# Patient Record
Sex: FEMALE | Race: WHITE | HISPANIC OR LATINO | URBAN - METROPOLITAN AREA
[De-identification: names, ages, dates, MRNs, and addresses within clinical notes are randomized per-mention and may not be internally consistent; named-entity substitution may affect disease eponyms.]

---

## 2021-11-09 ENCOUNTER — OFFICE VISIT (OUTPATIENT)
Dept: URBAN - METROPOLITAN AREA CLINIC 78 | Facility: CLINIC | Age: 46
End: 2021-11-09
Payer: COMMERCIAL

## 2021-11-09 VITALS
HEART RATE: 78 BPM | WEIGHT: 133.6 LBS | TEMPERATURE: 96.7 F | HEIGHT: 65 IN | BODY MASS INDEX: 22.26 KG/M2 | DIASTOLIC BLOOD PRESSURE: 71 MMHG | SYSTOLIC BLOOD PRESSURE: 107 MMHG

## 2021-11-09 DIAGNOSIS — K21.9 GASTROESOPHAGEAL REFLUX DISEASE, UNSPECIFIED WHETHER ESOPHAGITIS PRESENT: ICD-10-CM

## 2021-11-09 DIAGNOSIS — R19.5 MUCUS IN STOOL: ICD-10-CM

## 2021-11-09 DIAGNOSIS — E56.9 VITAMIN D DEFICIENCY: ICD-10-CM

## 2021-11-09 DIAGNOSIS — D64.89 ANEMIA DUE TO OTHER CAUSE: ICD-10-CM

## 2021-11-09 PROCEDURE — 99204 OFFICE O/P NEW MOD 45 MIN: CPT | Performed by: INTERNAL MEDICINE

## 2021-11-09 PROCEDURE — 99244 OFF/OP CNSLTJ NEW/EST MOD 40: CPT | Performed by: INTERNAL MEDICINE

## 2021-11-09 RX ORDER — LEVOTHYROXINE SODIUM 0.03 MG/1
1 TABLET IN THE MORNING ON AN EMPTY STOMACH TABLET ORAL ONCE A DAY
Status: ACTIVE | COMMUNITY

## 2021-11-09 NOTE — HPI-TODAY'S VISIT:
The patient was referred to us by Dr. Jose Rodriguez for acid reflux. A copy of this note will be sent to the referring physician.  She has had a diagnosis of IBS for years. She tends to see mucus often in the stools. She has lately been noticing that when she consumes multiple foods and even water, she has a bitter taste.  She feels that she has been overmedicated in the past, particularly as she has been tried on various PPIs. She is hesitant to go back on these. She tried Maalox and had an abnormal reaction. Her allergist had recommended she avoid all meds for a time being "to cleanse her body from all the meds she was using."  She is not on probiotics.   She has a feeling of early satiety. She has regurgitation of acid and bitter juices up in her mouth that burn.   She denies rectal bleeding. The patient has no pertinent additional complaints of anorexia or unintentional weight loss. She did go down to 88lbs in 2013.  She has not had dysphagia.   She was recently noted to be anemic. She is also severely deficient in Vit D.   She has an intolerance to lactose and to multiple other foods.  She has had Amebiasis on 2 occasions.   The patient does not take blood thinners.  She has had issues with both hypothyroidism and hyperthyroidism.   She has an autistic son and her mother was recently diagnosed with lymphoma, hence she has been under increased stress.   The patient last had a colonoscopy in 2012. There is no FH of colon cancer or colon polyps.   She is originally from [facility].   Summary of prior workup: - Labs: 6/30/2021: Allergy profile showed was abnormal for peanuts wheat, soybean, sesame seeds. Trypsin 655. Thyroid peroxidase antibodies negative, thyroglobulin antibodies negative.  low ZANDER positive. Rheumatoid factor negative. TSH 4.06. Free T4 1.3. Free T3 0.21. Vitamin D extremely low at 10. B12 511. WBC 5.7, hemoglobin 11.0, MCV 87, platelets 198 glucose 104, BUN 14, creatinine 0.6, sodium 137, potassium 3.7, total protein 7.3, albumin 4.3, total bilirubin 0.3, alkaline phosphatase 49, AST 12, ALT 7 lipase 53. Amylase 70. H. pylori breath test not effective. Iron 74, percent saturation 22, ferritin 78, folate 7.8, - Labs on 6/26/2021: WBC 4.2, hemoglobin 12.6, MCV 89, platelets 221. CMP normal. Antidouble-stranded antibodies negative. Anti-Contreras, antiscleroderma, stroke (and anti-Floresita antibodies negative. - Colonoscopy by [facility] on 5/31/2012: Congested mucosa in the rectum. Nonbleeding external hemorrhoids. Biopsies were negative for active, chronic or microscopic colitis. - EGD by  on 10/21/2011. Normal upper GI tract except for mild antral erythema. No H pylori. Basaloid hyperplasia noted on the lower esoph biopsies. Duodenal biopsies not obtained.

## 2022-02-02 ENCOUNTER — OFFICE VISIT (OUTPATIENT)
Dept: URBAN - METROPOLITAN AREA SURGERY CENTER 15 | Facility: SURGERY CENTER | Age: 47
End: 2022-02-02

## 2022-04-13 ENCOUNTER — OFFICE VISIT (OUTPATIENT)
Dept: URBAN - METROPOLITAN AREA SURGERY CENTER 15 | Facility: SURGERY CENTER | Age: 47
End: 2022-04-13

## 2022-05-25 ENCOUNTER — OFFICE VISIT (OUTPATIENT)
Dept: URBAN - METROPOLITAN AREA SURGERY CENTER 15 | Facility: SURGERY CENTER | Age: 47
End: 2022-05-25

## 2022-05-27 ENCOUNTER — OFFICE VISIT (OUTPATIENT)
Dept: URBAN - METROPOLITAN AREA SURGERY CENTER 15 | Facility: SURGERY CENTER | Age: 47
End: 2022-05-27

## 2022-07-27 ENCOUNTER — OFFICE VISIT (OUTPATIENT)
Dept: URBAN - METROPOLITAN AREA SURGERY CENTER 15 | Facility: SURGERY CENTER | Age: 47
End: 2022-07-27
Payer: COMMERCIAL

## 2022-07-27 DIAGNOSIS — K29.60 ADENOPAPILLOMATOSIS GASTRICA: ICD-10-CM

## 2022-07-27 DIAGNOSIS — K29.80 ACUTE DUODENITIS: ICD-10-CM

## 2022-07-27 DIAGNOSIS — K31.89 ACQUIRED DEFORMITY OF DUODENUM: ICD-10-CM

## 2022-07-27 DIAGNOSIS — K22.89 DILATATION OF ESOPHAGUS: ICD-10-CM

## 2022-07-27 PROCEDURE — 43239 EGD BIOPSY SINGLE/MULTIPLE: CPT | Performed by: INTERNAL MEDICINE

## 2022-07-27 PROCEDURE — G8907 PT DOC NO EVENTS ON DISCHARG: HCPCS | Performed by: INTERNAL MEDICINE

## 2022-07-27 RX ORDER — LEVOTHYROXINE SODIUM 0.03 MG/1
1 TABLET IN THE MORNING ON AN EMPTY STOMACH TABLET ORAL ONCE A DAY
Status: ACTIVE | COMMUNITY

## 2022-09-02 ENCOUNTER — TELEPHONE ENCOUNTER (OUTPATIENT)
Dept: URBAN - METROPOLITAN AREA CLINIC 118 | Facility: CLINIC | Age: 47
End: 2022-09-02

## 2022-10-06 ENCOUNTER — OFFICE VISIT (OUTPATIENT)
Dept: URBAN - METROPOLITAN AREA CLINIC 78 | Facility: CLINIC | Age: 47
End: 2022-10-06
Payer: COMMERCIAL

## 2022-10-06 VITALS
BODY MASS INDEX: 20.12 KG/M2 | WEIGHT: 120.8 LBS | HEIGHT: 65 IN | SYSTOLIC BLOOD PRESSURE: 91 MMHG | RESPIRATION RATE: 16 BRPM | HEART RATE: 76 BPM | DIASTOLIC BLOOD PRESSURE: 61 MMHG | TEMPERATURE: 98.1 F

## 2022-10-06 DIAGNOSIS — T78.40XS ALLERGY, SEQUELA: ICD-10-CM

## 2022-10-06 DIAGNOSIS — E73.9 LACTOSE INTOLERANCE: ICD-10-CM

## 2022-10-06 DIAGNOSIS — M81.0 OSTEOPOROSIS WITHOUT CURRENT PATHOLOGICAL FRACTURE, UNSPECIFIED OSTEOPOROSIS TYPE: ICD-10-CM

## 2022-10-06 DIAGNOSIS — R19.8 ALTERNATING CONSTIPATION AND DIARRHEA: ICD-10-CM

## 2022-10-06 DIAGNOSIS — R19.5 MUCUS IN STOOL: ICD-10-CM

## 2022-10-06 DIAGNOSIS — R63.4 UNINTENTIONAL WEIGHT LOSS: ICD-10-CM

## 2022-10-06 DIAGNOSIS — K21.9 GASTROESOPHAGEAL REFLUX DISEASE, UNSPECIFIED WHETHER ESOPHAGITIS PRESENT: ICD-10-CM

## 2022-10-06 DIAGNOSIS — F43.9 STRESS: ICD-10-CM

## 2022-10-06 DIAGNOSIS — D64.9 ANEMIA, UNSPECIFIED TYPE: ICD-10-CM

## 2022-10-06 DIAGNOSIS — C43.9 MALIGNANT MELANOMA, UNSPECIFIED SITE: ICD-10-CM

## 2022-10-06 DIAGNOSIS — E56.9 VITAMIN D DEFICIENCY: ICD-10-CM

## 2022-10-06 DIAGNOSIS — E03.9 HYPOTHYROIDISM (ACQUIRED): ICD-10-CM

## 2022-10-06 PROBLEM — 262188008 STRESS: Status: ACTIVE | Noted: 2021-11-09

## 2022-10-06 PROBLEM — 473011001: Status: ACTIVE | Noted: 2022-10-06

## 2022-10-06 PROBLEM — 372244006: Status: ACTIVE | Noted: 2022-10-06

## 2022-10-06 PROBLEM — 782415009: Status: ACTIVE | Noted: 2021-11-09

## 2022-10-06 PROBLEM — 64859006: Status: ACTIVE | Noted: 2022-10-06

## 2022-10-06 PROBLEM — 111566002: Status: ACTIVE | Noted: 2021-11-09

## 2022-10-06 PROBLEM — 235595009: Status: ACTIVE | Noted: 2021-11-09

## 2022-10-06 PROCEDURE — 99214 OFFICE O/P EST MOD 30 MIN: CPT | Performed by: INTERNAL MEDICINE

## 2022-10-06 RX ORDER — LEVOTHYROXINE SODIUM 0.03 MG/1
1 TABLET IN THE MORNING ON AN EMPTY STOMACH TABLET ORAL ONCE A DAY
Status: ACTIVE | COMMUNITY

## 2022-10-06 RX ORDER — SODIUM PICOSULFATE, MAGNESIUM OXIDE, AND ANHYDROUS CITRIC ACID 10; 3.5; 12 MG/160ML; G/160ML; G/160ML
160ML AS DIRECTED LIQUID ORAL ONCE
Qty: 1 | Refills: 0 | OUTPATIENT
Start: 2022-10-06 | End: 2022-10-07

## 2022-10-06 NOTE — HPI-TODAY'S VISIT:
The patient was referred to us by Dr. Jose Rodriguez for acid reflux. A copy of this note will be sent to the referring physician.  She has had a diagnosis of IBS for years. She tends to see mucus often in the stools. She has lately been noticing that when she consumes multiple foods and even water, she has a bitter taste.  She feels that she has been overmedicated in the past, particularly as she has been tried on various PPIs. She is hesitant to go back on these. She tried Maalox and had an abnormal reaction. Her allergist had recommended she avoid all meds for a time being "to cleanse her body from all the meds she was using."  She is not on probiotics.   She has not had as much reflux lately, but admits to a metallic taste in her mouth. She has regurgitation of acid and bitter juices up in her mouth that burn.  She may go several days without having a BM, and then may eat certain things which cause fecal urgency.  She gets a lot of cramping following the BM's. She has been avoiding multiple foods and continues thus losing weight.   She has a feeling of early satiety.   She denies rectal bleeding. The patient has no pertinent additional complaints of anorexia or unintentional weight loss. She did go down to 88lbs in 2013.  She has not had dysphagia.   She was recently noted to be anemic. She is also severely deficient in Vit D.  She has anemia. She cannot tolerate PO Fe and is needing to make an appt with a Hematologist for Fe infusion.   She has an intolerance to lactose and to multiple other foods.  She has had Amebiasis on 2 occasions.   She was seen by Dr. Cinda Campbell on 8/26/22 who found a 0.4mm malignant melanoma from the midline upper back ([id number]). She now needs a wide excision.   The patient does not take blood thinners.  She has had issues with both hypothyroidism and hyperthyroidism.  She had a BDS which showed osteoporosis. Dr. Rodriguez is looking at whether insurance will pay Boniva or Prolia.  She has an autistic son and her mother has lymphoma, hence she has been under increased stress.   The patient last had a colonoscopy in 2012. There is no FH of colon cancer or colon polyps in any first degree relatives. An uncle had polyps removed in his 70's.   She is originally from [facility].   Summary of prior workup: - EGD by me on 7/27/2022: Ectopic gastric mucosa in the upper third of the esophagus, otherwise unremarkable.  No gross lesions in the cardia or fundus.  Mild patchy erythema in the body and antrum.  Normal duodenum/ampulla.  Biopsies were negative for celiac sprue or infectious organisms.  No H. pylori.  Lower esophageal biopsies suggestive of acid reflux changes. - Labs: 6/30/2021: Allergy profile showed was abnormal for peanuts wheat, soybean, sesame seeds. Trypsin 655. Thyroid peroxidase antibodies negative, thyroglobulin antibodies negative.  low ZANDER positive. Rheumatoid factor negative. TSH 4.06. Free T4 1.3. Free T3 0.21. Vitamin D extremely low at 10. B12 511. WBC 5.7, hemoglobin 11.0, MCV 87, platelets 198 glucose 104, BUN 14, creatinine 0.6, sodium 137, potassium 3.7, total protein 7.3, albumin 4.3, total bilirubin 0.3, alkaline phosphatase 49, AST 12, ALT 7 lipase 53. Amylase 70. H. pylori breath test not effective. Iron 74, percent saturation 22, ferritin 78, folate 7.8, - Labs on 6/26/2021: WBC 4.2, hemoglobin 12.6, MCV 89, platelets 221. CMP normal. Antidouble-stranded antibodies negative. Anti-Contreras, antiscleroderma, stroke (and anti-Floresita antibodies negative. - Colonoscopy by  on 5/31/2012: Congested mucosa in the rectum. Nonbleeding external hemorrhoids. Biopsies were negative for active, chronic or microscopic colitis. - EGD by  on 10/21/2011. Normal upper GI tract except for mild antral erythema. No H pylori. Basaloid hyperplasia noted on the lower esoph biopsies. Duodenal biopsies not obtained.

## 2022-10-07 PROBLEM — 271737000 ANEMIA: Status: ACTIVE | Noted: 2021-12-16

## 2022-10-13 ENCOUNTER — TELEPHONE ENCOUNTER (OUTPATIENT)
Dept: URBAN - METROPOLITAN AREA CLINIC 78 | Facility: CLINIC | Age: 47
End: 2022-10-13

## 2022-10-20 LAB
HEMATOCRIT: 34.8
HEMOGLOBIN: 11.5
MCH: 28.3
MCHC: 33
MCV: 86
NRBC: (no result)
PLATELETS: 231
RBC: 4.06
RDW: 13.5
VITAMIN D, 25-HYDROXY: 11.5
WBC: 4.2

## 2022-11-09 ENCOUNTER — OFFICE VISIT (OUTPATIENT)
Dept: URBAN - METROPOLITAN AREA SURGERY CENTER 15 | Facility: SURGERY CENTER | Age: 47
End: 2022-11-09

## 2022-12-21 ENCOUNTER — OFFICE VISIT (OUTPATIENT)
Dept: URBAN - METROPOLITAN AREA SURGERY CENTER 15 | Facility: SURGERY CENTER | Age: 47
End: 2022-12-21

## 2023-01-06 ENCOUNTER — CLAIMS CREATED FROM THE CLAIM WINDOW (OUTPATIENT)
Dept: URBAN - METROPOLITAN AREA SURGERY CENTER 15 | Facility: SURGERY CENTER | Age: 48
End: 2023-01-06
Payer: COMMERCIAL

## 2023-01-06 ENCOUNTER — OFFICE VISIT (OUTPATIENT)
Dept: URBAN - METROPOLITAN AREA SURGERY CENTER 15 | Facility: SURGERY CENTER | Age: 48
End: 2023-01-06

## 2023-01-06 DIAGNOSIS — D50.9 ANEMIA: ICD-10-CM

## 2023-01-06 DIAGNOSIS — R19.4 ALTERATION IN BOWEL ELIMINATION: ICD-10-CM

## 2023-01-06 DIAGNOSIS — K62.89 ACUTE PROCTITIS: ICD-10-CM

## 2023-01-06 PROCEDURE — G8907 PT DOC NO EVENTS ON DISCHARG: HCPCS | Performed by: INTERNAL MEDICINE

## 2023-01-06 PROCEDURE — 45380 COLONOSCOPY AND BIOPSY: CPT | Performed by: INTERNAL MEDICINE

## 2023-01-06 RX ORDER — LEVOTHYROXINE SODIUM 0.03 MG/1
1 TABLET IN THE MORNING ON AN EMPTY STOMACH TABLET ORAL ONCE A DAY
Status: ACTIVE | COMMUNITY

## 2023-01-31 ENCOUNTER — TELEPHONE ENCOUNTER (OUTPATIENT)
Dept: URBAN - METROPOLITAN AREA CLINIC 80 | Facility: CLINIC | Age: 48
End: 2023-01-31

## 2023-01-31 RX ORDER — ONDANSETRON HYDROCHLORIDE 8 MG/1
1 TABLET TABLET, FILM COATED ORAL
Qty: 40 | Refills: 1 | OUTPATIENT
Start: 2023-02-03

## 2023-03-31 ENCOUNTER — OFFICE VISIT (OUTPATIENT)
Dept: URBAN - METROPOLITAN AREA CLINIC 78 | Facility: CLINIC | Age: 48
End: 2023-03-31
Payer: COMMERCIAL

## 2023-03-31 VITALS
SYSTOLIC BLOOD PRESSURE: 99 MMHG | WEIGHT: 111.2 LBS | HEART RATE: 82 BPM | TEMPERATURE: 98.2 F | RESPIRATION RATE: 17 BRPM | BODY MASS INDEX: 18.53 KG/M2 | DIASTOLIC BLOOD PRESSURE: 69 MMHG | HEIGHT: 65 IN

## 2023-03-31 DIAGNOSIS — E73.9 LACTOSE INTOLERANCE: ICD-10-CM

## 2023-03-31 DIAGNOSIS — R63.4 UNINTENTIONAL WEIGHT LOSS: ICD-10-CM

## 2023-03-31 DIAGNOSIS — K21.9 GASTROESOPHAGEAL REFLUX DISEASE, UNSPECIFIED WHETHER ESOPHAGITIS PRESENT: ICD-10-CM

## 2023-03-31 DIAGNOSIS — E56.9 VITAMIN D DEFICIENCY: ICD-10-CM

## 2023-03-31 DIAGNOSIS — K58.0 IRRITABLE BOWEL SYNDROME WITH DIARRHEA: ICD-10-CM

## 2023-03-31 PROBLEM — 165517008: Status: ACTIVE | Noted: 2023-03-31

## 2023-03-31 PROBLEM — 197125005: Status: ACTIVE | Noted: 2023-03-31

## 2023-03-31 PROBLEM — 300331000: Status: ACTIVE | Noted: 2023-03-31

## 2023-03-31 PROBLEM — 34713006: Status: ACTIVE | Noted: 2023-03-31

## 2023-03-31 PROCEDURE — 99215 OFFICE O/P EST HI 40 MIN: CPT | Performed by: INTERNAL MEDICINE

## 2023-03-31 RX ORDER — ONDANSETRON HYDROCHLORIDE 8 MG/1
1 TABLET TABLET, FILM COATED ORAL
Qty: 40 | Refills: 1 | Status: ACTIVE | COMMUNITY
Start: 2023-02-03

## 2023-03-31 RX ORDER — LEVOTHYROXINE SODIUM 0.03 MG/1
1 TABLET IN THE MORNING ON AN EMPTY STOMACH TABLET ORAL ONCE A DAY
Status: ACTIVE | COMMUNITY

## 2023-03-31 NOTE — HPI-TODAY'S VISIT:
The patient was referred to us by Dr. Jose Rodriguez for acid reflux. A copy of this note will be sent to the referring physician.  She has had a diagnosis of IBS for years. She tends to see mucus often in the stools.  She feels that she has been overmedicated in the past, particularly as she has been tried on various PPIs. She is hesitant to go back on these. She tried Maalox and had an abnormal reaction.  She is not on probiotics.   She has not had as much reflux lately, but admits to a metallic taste in her mouth. She has regurgitation of acid and bitter juices up in her mouth that burn.  She may go several days without having a BM, and then may eat certain things which cause fecal urgency. She continues to have episodes of severe pain.   She gets a lot of cramping following the BM's. She has been avoiding multiple foods and continues thus losing weight. Her BMI has gone from 22, to 20 to no 18.5.  She did go down to 88lbs in 2013.  Patient recently seen by Dr. Saul Becker/(Onc) for liver lesions noted incidentally on the CT scan.  An MRI was ordered.  Patient also has an anemia/neutropenia without evidence of iron deficiency.  Normal renal function and electrolytes.  These have been above unclear etiology.  Given coexistent fevers and night sweats as well as weight loss there is some concern for underlying hematologic malignancy or autoimmune conditions.  Repeat labs were ordered and bone marrow biopsy is being considered.  She has a feeling of early satiety.  She denies rectal bleeding.   She has not had dysphagia.   She is also severely deficient in Vit D. Vit D remains quite low at 11.5.  She has anemia. She cannot tolerate PO Fe and is needing to make an appt with a Hematologist for Fe infusion.   She has an intolerance to lactose and to multiple other foods.  She has had Amebiasis on 2 occasions.   She was seen by Dr. Cinda Campbell on 8/26/22 who found a 0.4mm malignant melanoma from the midline upper back ([id number]) s/p  wide excision.   The patient does not take blood thinners.  She has had issues with both hypothyroidism and hyperthyroidism.  She had a BDS which showed osteoporosis. Dr. Rodriguez is looking at whether insurance will pay Boniva or Prolia.  She has an autistic son and her mother has lymphoma, hence she has been under increased stress.    There is no FH of colon cancer or colon polyps in any first degree relatives. An uncle had polyps removed in his 70's.   She is concerned about her Mg levels and would like for these to be checked.   She is originally from [facility].   Summary of prior workup: - Labs on labs: 3/23: Albumin 4.2, total protein 7.9, CRP 0.7, WBC 5.5, eosinophils slightly elevated at 0.11/2.4% range.  Platelets 267, MCV 84, ESR 25, ZANDER 1 in 80 which is high.  No diagnostic immunophenotypic abnormalities detected to suggest leukemia or lymphoma.  SPEP did not show any monoclonal protein detected. - Colonoscopy by me on 1/23 revealed a normal terminal ileum, 4 mm hyperplastic rectal polyp and internal hemorrhoids.  The quality of the prep was good.  A repeat colonoscopy was advised in 10 years.  - Vit D remains quite low at 11.5. Normal CBC with a white blood cell count of 4.2, Hb 11.5, MCV 86 and plats 231. - EGD by me on 7/27/2022: Ectopic gastric mucosa in the upper third of the esophagus, otherwise unremarkable.  No gross lesions in the cardia or fundus.  Mild patchy erythema in the body and antrum.  Normal duodenum/ampulla.  Biopsies were negative for celiac sprue or infectious organisms.  No H. pylori.  Lower esophageal biopsies suggestive of acid reflux changes. - Labs: 6/30/2021: Allergy profile showed was abnormal for peanuts wheat, soybean, sesame seeds. Trypsin 655. Thyroid peroxidase antibodies negative, thyroglobulin antibodies negative.  low ZANDER positive. Rheumatoid factor negative. TSH 4.06. Free T4 1.3. Free T3 0.21. Vitamin D extremely low at 10. B12 511. WBC 5.7, hemoglobin 11.0, MCV 87, platelets 198 glucose 104, BUN 14, creatinine 0.6, sodium 137, potassium 3.7, total protein 7.3, albumin 4.3, total bilirubin 0.3, alkaline phosphatase 49, AST 12, ALT 7 lipase 53. Amylase 70. H. pylori breath test not effective. Iron 74, percent saturation 22, ferritin 78, folate 7.8, - Labs on 6/26/2021: WBC 4.2, hemoglobin 12.6, MCV 89, platelets 221. CMP normal. Antidouble-stranded antibodies negative. Anti-Contreras, antiscleroderma, stroke (and anti-Floresita antibodies negative. - Colonoscopy by  on 5/31/2012: Congested mucosa in the rectum. Nonbleeding external hemorrhoids. Biopsies were negative for active, chronic or microscopic colitis. - EGD by  on 10/21/2011. Normal upper GI tract except for mild antral erythema. No H pylori. Basaloid hyperplasia noted on the lower esoph biopsies. Duodenal biopsies not obtained.

## 2023-04-06 LAB — MAGNESIUM: 2.3

## 2023-06-12 ENCOUNTER — OFFICE VISIT (OUTPATIENT)
Dept: URBAN - METROPOLITAN AREA CLINIC 78 | Facility: CLINIC | Age: 48
End: 2023-06-12
Payer: COMMERCIAL

## 2023-06-12 VITALS
TEMPERATURE: 97.7 F | SYSTOLIC BLOOD PRESSURE: 91 MMHG | RESPIRATION RATE: 16 BRPM | HEART RATE: 78 BPM | HEIGHT: 65 IN | WEIGHT: 115 LBS | BODY MASS INDEX: 19.16 KG/M2 | DIASTOLIC BLOOD PRESSURE: 60 MMHG

## 2023-06-12 DIAGNOSIS — D50.0 ANEMIA: ICD-10-CM

## 2023-06-12 DIAGNOSIS — K21.9 GASTROESOPHAGEAL REFLUX DISEASE, UNSPECIFIED WHETHER ESOPHAGITIS PRESENT: ICD-10-CM

## 2023-06-12 DIAGNOSIS — R19.5 MUCUS IN STOOL: ICD-10-CM

## 2023-06-12 DIAGNOSIS — K58.0 IRRITABLE BOWEL SYNDROME WITH DIARRHEA: ICD-10-CM

## 2023-06-12 PROCEDURE — 99214 OFFICE O/P EST MOD 30 MIN: CPT | Performed by: INTERNAL MEDICINE

## 2023-06-12 RX ORDER — ONDANSETRON HYDROCHLORIDE 8 MG/1
1 TABLET TABLET, FILM COATED ORAL
Qty: 40 | Refills: 1 | Status: ACTIVE | COMMUNITY
Start: 2023-02-03

## 2023-06-12 RX ORDER — LEVOTHYROXINE SODIUM 0.03 MG/1
1 TABLET IN THE MORNING ON AN EMPTY STOMACH TABLET ORAL ONCE A DAY
Status: ACTIVE | COMMUNITY

## 2023-06-12 NOTE — PHYSICAL EXAM CONSTITUTIONAL:
thin, malnourished , in no acute distress , ambulating without difficulty , normal communication ability
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
## 2023-09-11 ENCOUNTER — OFFICE VISIT (OUTPATIENT)
Dept: URBAN - METROPOLITAN AREA CLINIC 78 | Facility: CLINIC | Age: 48
End: 2023-09-11
Payer: COMMERCIAL

## 2023-09-11 VITALS
HEART RATE: 78 BPM | TEMPERATURE: 98 F | HEIGHT: 65 IN | WEIGHT: 119 LBS | DIASTOLIC BLOOD PRESSURE: 61 MMHG | SYSTOLIC BLOOD PRESSURE: 98 MMHG | BODY MASS INDEX: 19.83 KG/M2

## 2023-09-11 DIAGNOSIS — R19.5 MUCUS IN STOOL: ICD-10-CM

## 2023-09-11 DIAGNOSIS — K76.9 LIVER LESION: ICD-10-CM

## 2023-09-11 DIAGNOSIS — K58.2 IRRITABLE BOWEL SYNDROME WITH ALTERNATING BOWEL HABITS: ICD-10-CM

## 2023-09-11 DIAGNOSIS — K21.9 ACID REFLUX: ICD-10-CM

## 2023-09-11 PROCEDURE — 99214 OFFICE O/P EST MOD 30 MIN: CPT | Performed by: INTERNAL MEDICINE

## 2023-09-11 RX ORDER — LEVOTHYROXINE SODIUM 0.03 MG/1
1 TABLET IN THE MORNING ON AN EMPTY STOMACH TABLET ORAL ONCE A DAY
Status: ACTIVE | COMMUNITY

## 2023-09-11 RX ORDER — ONDANSETRON HYDROCHLORIDE 8 MG/1
1 TABLET TABLET, FILM COATED ORAL
Qty: 40 | Refills: 1 | Status: ACTIVE | COMMUNITY
Start: 2023-02-03
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
## 2023-10-30 ENCOUNTER — LAB OUTSIDE AN ENCOUNTER (OUTPATIENT)
Dept: URBAN - METROPOLITAN AREA CLINIC 78 | Facility: CLINIC | Age: 48
End: 2023-10-30

## 2023-10-30 ENCOUNTER — OFFICE VISIT (OUTPATIENT)
Dept: URBAN - METROPOLITAN AREA CLINIC 78 | Facility: CLINIC | Age: 48
End: 2023-10-30
Payer: COMMERCIAL

## 2023-10-30 ENCOUNTER — OFFICE VISIT (OUTPATIENT)
Dept: URBAN - METROPOLITAN AREA CLINIC 77 | Facility: CLINIC | Age: 48
End: 2023-10-30
Payer: COMMERCIAL

## 2023-10-30 VITALS
RESPIRATION RATE: 16 BRPM | HEART RATE: 75 BPM | WEIGHT: 120 LBS | TEMPERATURE: 98.2 F | SYSTOLIC BLOOD PRESSURE: 104 MMHG | BODY MASS INDEX: 19.99 KG/M2 | HEIGHT: 65 IN | DIASTOLIC BLOOD PRESSURE: 66 MMHG

## 2023-10-30 DIAGNOSIS — E55.9 VITAMIN D DEFICIENCY: ICD-10-CM

## 2023-10-30 DIAGNOSIS — R63.4 UNINTENTIONAL WEIGHT LOSS: ICD-10-CM

## 2023-10-30 DIAGNOSIS — K21.9 GASTROESOPHAGEAL REFLUX DISEASE, UNSPECIFIED WHETHER ESOPHAGITIS PRESENT: ICD-10-CM

## 2023-10-30 DIAGNOSIS — D64.89 OTHER SPECIFIED ANEMIAS: ICD-10-CM

## 2023-10-30 DIAGNOSIS — K58.0 IRRITABLE BOWEL SYNDROME WITH DIARRHEA: ICD-10-CM

## 2023-10-30 PROCEDURE — 99214 OFFICE O/P EST MOD 30 MIN: CPT | Performed by: INTERNAL MEDICINE

## 2023-10-30 PROCEDURE — 96372 THER/PROPH/DIAG INJ SC/IM: CPT | Performed by: INTERNAL MEDICINE

## 2023-10-30 RX ORDER — ONDANSETRON HYDROCHLORIDE 8 MG/1
1 TABLET TABLET, FILM COATED ORAL
Qty: 40 | Refills: 1 | Status: ACTIVE | COMMUNITY
Start: 2023-02-03

## 2023-10-30 RX ORDER — LEVOTHYROXINE SODIUM 0.03 MG/1
1 TABLET IN THE MORNING ON AN EMPTY STOMACH TABLET ORAL ONCE A DAY
Status: ACTIVE | COMMUNITY
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
## 2023-11-30 ENCOUNTER — OFFICE VISIT (OUTPATIENT)
Dept: URBAN - METROPOLITAN AREA CLINIC 77 | Facility: CLINIC | Age: 48
End: 2023-11-30
Payer: COMMERCIAL

## 2023-11-30 DIAGNOSIS — E56.8 DEFICIENCY OF OTHER VITAMINS: ICD-10-CM

## 2023-11-30 PROCEDURE — 96372 THER/PROPH/DIAG INJ SC/IM: CPT | Performed by: INTERNAL MEDICINE

## 2023-11-30 RX ORDER — ONDANSETRON HYDROCHLORIDE 8 MG/1
1 TABLET TABLET, FILM COATED ORAL
Qty: 40 | Refills: 1 | Status: ACTIVE | COMMUNITY
Start: 2023-02-03

## 2023-11-30 RX ORDER — LEVOTHYROXINE SODIUM 0.03 MG/1
1 TABLET IN THE MORNING ON AN EMPTY STOMACH TABLET ORAL ONCE A DAY
Status: ACTIVE | COMMUNITY

## 2023-12-26 ENCOUNTER — OFFICE VISIT (OUTPATIENT)
Dept: URBAN - METROPOLITAN AREA CLINIC 78 | Facility: CLINIC | Age: 48
End: 2023-12-26

## 2024-01-09 ENCOUNTER — TELEPHONE ENCOUNTER (OUTPATIENT)
Dept: URBAN - METROPOLITAN AREA CLINIC 78 | Facility: CLINIC | Age: 49
End: 2024-01-09

## 2024-02-12 ENCOUNTER — VITD (OUTPATIENT)
Dept: URBAN - METROPOLITAN AREA CLINIC 77 | Facility: CLINIC | Age: 49
End: 2024-02-12
Payer: COMMERCIAL

## 2024-02-12 ENCOUNTER — OV EP (OUTPATIENT)
Dept: URBAN - METROPOLITAN AREA CLINIC 78 | Facility: CLINIC | Age: 49
End: 2024-02-12
Payer: COMMERCIAL

## 2024-02-12 VITALS
HEIGHT: 65 IN | BODY MASS INDEX: 21.49 KG/M2 | SYSTOLIC BLOOD PRESSURE: 92 MMHG | TEMPERATURE: 97.9 F | DIASTOLIC BLOOD PRESSURE: 68 MMHG | WEIGHT: 129 LBS | HEART RATE: 85 BPM

## 2024-02-12 DIAGNOSIS — R19.8 ALTERNATING CONSTIPATION AND DIARRHEA: ICD-10-CM

## 2024-02-12 DIAGNOSIS — K21.9 GASTROESOPHAGEAL REFLUX DISEASE, UNSPECIFIED WHETHER ESOPHAGITIS PRESENT: ICD-10-CM

## 2024-02-12 DIAGNOSIS — R63.4 UNINTENTIONAL WEIGHT LOSS: ICD-10-CM

## 2024-02-12 DIAGNOSIS — E55.9 VITAMIN D DEFICIENCY: ICD-10-CM

## 2024-02-12 DIAGNOSIS — D64.89 OTHER SPECIFIED ANEMIAS: ICD-10-CM

## 2024-02-12 PROCEDURE — 99214 OFFICE O/P EST MOD 30 MIN: CPT | Performed by: INTERNAL MEDICINE

## 2024-02-12 PROCEDURE — 96372 THER/PROPH/DIAG INJ SC/IM: CPT | Performed by: INTERNAL MEDICINE

## 2024-02-12 RX ORDER — ONDANSETRON HYDROCHLORIDE 8 MG/1
1 TABLET TABLET, FILM COATED ORAL
Qty: 40 | Refills: 1 | Status: ACTIVE | COMMUNITY
Start: 2023-02-03

## 2024-02-12 RX ORDER — LEVOTHYROXINE SODIUM 0.03 MG/1
1 TABLET IN THE MORNING ON AN EMPTY STOMACH TABLET ORAL ONCE A DAY
Status: ACTIVE | COMMUNITY

## 2024-02-12 NOTE — HPI-TODAY'S VISIT:
The patient was referred to us by Dr. Jose Rodriguez for acid reflux. A copy of this note will be sent to the referring physician.  She has had a diagnosis of IBS for years. She tends to see mucus often in the stools.  She feels that she has been overmedicated in the past, particularly as she has been tried on various PPIs. She is hesitant to go back on these. She tried Maalox and had an abnormal reaction.  She is not on probiotics.   She has not had as much reflux lately, but admits to a metallic taste in her mouth. She has regurgitation of acid and bitter juices up in her mouth that burn.  She may go several days without having a BM, and then may eat certain things which cause fecal urgency. She continues to have episodes of severe pain.   She has been very constipated and bloated. Latey she has not had a BM for 3-4 days, and will then She has diffuse abodminal pain secondary to the increased pressure. She has been active, consuming a high fiber diet and drinking a good anount of water. She has been eating dark chocolate and lemon.  No heartburn.  She has a feeling of early satiety, but this is better.  She denies rectal bleeding.   She has not had dysphagia.   She is also severely deficient in Vit D (Vit D has been as low as 6).  Her Vit D remains very low. She has been tolerating Vit D IM replacement.   About 2 weeks ago, she had 3 straight days of nausea and vomtiing. She had to go to the ED where she was foudn to be severely hypokalemic. After she was discharged, she had several days of diarrhea.  She had to have her thyroid pill changed due to cost. Barbara has not been able to tolerate the new pill. She will be going back to the old one today or tomorrow.    She has however been able to regain additional weight. Her BMI is now up to 21.   She was noted to be borderline anemic in June 2023. Her labs were rechecked in Jan 2024 and Hb was normal. She cannot tolerate PO Fe (she felt it made her too nauseous). She was to make an appt with a Hematologist for Fe infusion.   She has an intolerance to lactose and to multiple other foods.  She has had Amebiasis on 2 occasions.   She has been able to maintain her weight. Not much in the way of abdominal pain or cramping.   She was seen by Dr. Cinda Campbell on 8/26/22 who found a 0.4mm malignant melanoma from the midline upper back ([id number]) s/p  wide excision.   The patient does not take blood thinners.  She has had issues with both hypothyroidism and hyperthyroidism.  She had a BDS which showed osteoporosis. Dr. Rodriguez is looking at whether insurance will pay Boniva or Prolia.  There is no FH of colon cancer or colon polyps in any first degree relatives. An uncle had polyps removed in his 70's.   She is originally from [facility].   She has been under increased stress. She has an autistic son and her mother has lymphoma  **She tolearted Clenpiq relatively well however she did get very nauseous and vomited. She required antiemetics when she presented to [facility].  Summary of prior workup: -Labs on 9/1/26/24: WBC 3.7, hemoglobin 12.3, MCV 81, platelets 168, sodium 133, potassium 2.9, glucose 145, BUN 12, creatinine 0.8, calcium 9.1, total protein 8.0, albumin 4.1, total bilirubin 0.4, alkaline phosphatase 52, AST 24, ALT 23.  Lipase 23.  Magnesium 2.0, UDS negative except for opiates screen, UA negative except for 2+ blood and trace bacteria. BDS on 9/26/23: Osteoporosis of the lumbar spine and osteopenia of the R femoral neck, Normal bone mineral density of the L femoral neck.  - Labs on 9.6.23: Normal CMP except for Cr of 1.12 but normal GFR of 61. T Chol 230, HDL 98. - Labs on 7/5/20 3: 8 4.02, free T41.32, total cholesterol 240, triglycerides 72.  HDL 98, .  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  The spleen, adrenal glands and pancreas are normal.  Minimal concentrated bile/sludge in the gallbladder.  No gallstones.  No biliary ductal dilation.  Small renal cyst.  No hydronephrosis.  No dilated loops of bowel.  No ascites.  No concerning lymph nodes.  Portal vein, SMV and splenic vein normal.  No aggressive osseous lesions. - Mg level normal at 2.6 on 4/6/23. - Labs on 6/8/2023: WBC 4.6, hemoglobin 11.4, MCV 84, platelets 218. - Labs on labs: 3/23: Albumin 4.2, total protein 7.9, CRP 0.7, WBC 5.5, eosinophils slightly elevated at 0.11/2.4% range.  Platelets 267, MCV 84, ESR 25, ZANDER 1 in 80 which is high.  No diagnostic immunophenotypic abnormalities detected to suggest leukemia or lymphoma.  SPEP did not show any monoclonal protein detected. - Colonoscopy by me on 1/23 revealed a normal terminal ileum, 4 mm hyperplastic rectal polyp and internal hemorrhoids.  The quality of the prep was good.  A repeat colonoscopy was advised in 10 years.  - Vit D remains quite low at 11.5. Normal CBC with a white blood cell count of 4.2, Hb 11.5, MCV 86 and plats 231. - EGD by me on 7/27/2022: Ectopic gastric mucosa in the upper third of the esophagus, otherwise unremarkable.  No gross lesions in the cardia or fundus.  Mild patchy erythema in the body and antrum.  Normal duodenum/ampulla.  Biopsies were negative for celiac sprue or infectious organisms.  No H. pylori.  Lower esophageal biopsies suggestive of acid reflux changes. - Labs: 6/30/2021: Allergy profile showed was abnormal for peanuts wheat, soybean, sesame seeds. Trypsin 655. Thyroid peroxidase antibodies negative, thyroglobulin antibodies negative.  low ZANDER positive. Rheumatoid factor negative. TSH 4.06. Free T4 1.3. Free T3 0.21. Vitamin D extremely low at 10. B12 511. WBC 5.7, hemoglobin 11.0, MCV 87, platelets 198 glucose 104, BUN 14, creatinine 0.6, sodium 137, potassium 3.7, total protein 7.3, albumin 4.3, total bilirubin 0.3, alkaline phosphatase 49, AST 12, ALT 7 lipase 53. Amylase 70. H. pylori breath test not effective. Iron 74, percent saturation 22, ferritin 78, folate 7.8, - Labs on 6/26/2021: WBC 4.2, hemoglobin 12.6, MCV 89, platelets 221. CMP normal. Antidouble-stranded antibodies negative. Anti-Contreras, antiscleroderma, stroke (and anti-Lforesita antibodies negative. - Colonoscopy by LG on 5/31/2012: Congested mucosa in the rectum. Nonbleeding external hemorrhoids. Biopsies were negative for active, chronic or microscopic colitis. - EGD by LG on 10/21/2011. Normal upper GI tract except for mild antral erythema. No H pylori. Basaloid hyperplasia noted on the lower esoph biopsies. Duodenal biopsies not obtained.

## 2024-03-25 ENCOUNTER — VITD (OUTPATIENT)
Dept: URBAN - METROPOLITAN AREA CLINIC 77 | Facility: CLINIC | Age: 49
End: 2024-03-25
Payer: COMMERCIAL

## 2024-03-25 DIAGNOSIS — E55.9 VITAMIN D DEFICIENCY: ICD-10-CM

## 2024-03-25 PROCEDURE — 96372 THER/PROPH/DIAG INJ SC/IM: CPT | Performed by: INTERNAL MEDICINE

## 2024-03-25 RX ORDER — ONDANSETRON HYDROCHLORIDE 8 MG/1
1 TABLET TABLET, FILM COATED ORAL
Qty: 40 | Refills: 1 | Status: ACTIVE | COMMUNITY
Start: 2023-02-03

## 2024-03-25 RX ORDER — LEVOTHYROXINE SODIUM 0.03 MG/1
1 TABLET IN THE MORNING ON AN EMPTY STOMACH TABLET ORAL ONCE A DAY
Status: ACTIVE | COMMUNITY

## 2024-05-13 ENCOUNTER — OFFICE VISIT (OUTPATIENT)
Dept: URBAN - METROPOLITAN AREA CLINIC 78 | Facility: CLINIC | Age: 49
End: 2024-05-13
Payer: COMMERCIAL

## 2024-05-13 ENCOUNTER — DASHBOARD ENCOUNTERS (OUTPATIENT)
Age: 49
End: 2024-05-13

## 2024-05-13 VITALS
HEART RATE: 80 BPM | HEIGHT: 65 IN | TEMPERATURE: 98 F | SYSTOLIC BLOOD PRESSURE: 95 MMHG | WEIGHT: 136.6 LBS | BODY MASS INDEX: 22.76 KG/M2 | DIASTOLIC BLOOD PRESSURE: 66 MMHG

## 2024-05-13 DIAGNOSIS — K58.0 IRRITABLE BOWEL SYNDROME WITH DIARRHEA: ICD-10-CM

## 2024-05-13 DIAGNOSIS — K21.9 CHRONIC GERD: ICD-10-CM

## 2024-05-13 DIAGNOSIS — R63.4 UNINTENTIONAL WEIGHT LOSS: ICD-10-CM

## 2024-05-13 DIAGNOSIS — K76.9 LIVER LESION: ICD-10-CM

## 2024-05-13 PROCEDURE — 99214 OFFICE O/P EST MOD 30 MIN: CPT | Performed by: INTERNAL MEDICINE

## 2024-05-13 RX ORDER — LEVOTHYROXINE SODIUM 0.03 MG/1
1 TABLET IN THE MORNING ON AN EMPTY STOMACH TABLET ORAL ONCE A DAY
Status: ACTIVE | COMMUNITY

## 2024-05-13 RX ORDER — ONDANSETRON HYDROCHLORIDE 8 MG/1
1 TABLET TABLET, FILM COATED ORAL
Qty: 40 | Refills: 1 | Status: ACTIVE | COMMUNITY
Start: 2023-02-03

## 2024-05-23 ENCOUNTER — TELEPHONE ENCOUNTER (OUTPATIENT)
Dept: URBAN - METROPOLITAN AREA CLINIC 78 | Facility: CLINIC | Age: 49
End: 2024-05-23

## 2024-05-23 LAB
THYROXINE (T4): 8.6
TRIIODOTHYRONINE (T3), FREE: 3.1
TSH: 2.47

## 2024-09-09 ENCOUNTER — OFFICE VISIT (OUTPATIENT)
Dept: URBAN - METROPOLITAN AREA CLINIC 78 | Facility: CLINIC | Age: 49
End: 2024-09-09

## 2024-11-14 ENCOUNTER — OFFICE VISIT (OUTPATIENT)
Dept: URBAN - METROPOLITAN AREA CLINIC 78 | Facility: CLINIC | Age: 49
End: 2024-11-14

## 2025-01-09 ENCOUNTER — OFFICE VISIT (OUTPATIENT)
Dept: URBAN - METROPOLITAN AREA CLINIC 78 | Facility: CLINIC | Age: 50
End: 2025-01-09
Payer: COMMERCIAL

## 2025-01-09 VITALS
HEIGHT: 65 IN | BODY MASS INDEX: 23.66 KG/M2 | WEIGHT: 142 LBS | DIASTOLIC BLOOD PRESSURE: 65 MMHG | TEMPERATURE: 98 F | SYSTOLIC BLOOD PRESSURE: 92 MMHG | HEART RATE: 83 BPM

## 2025-01-09 DIAGNOSIS — D70.9 NEUTROPENIA, UNSPECIFIED TYPE: ICD-10-CM

## 2025-01-09 DIAGNOSIS — E03.9 HYPOTHYROIDISM (ACQUIRED): ICD-10-CM

## 2025-01-09 DIAGNOSIS — F43.9 STRESS: ICD-10-CM

## 2025-01-09 DIAGNOSIS — R05.3 CHRONIC COUGH: ICD-10-CM

## 2025-01-09 DIAGNOSIS — E55.9 VITAMIN D DEFICIENCY: ICD-10-CM

## 2025-01-09 DIAGNOSIS — K21.9 GASTROESOPHAGEAL REFLUX DISEASE, UNSPECIFIED WHETHER ESOPHAGITIS PRESENT: ICD-10-CM

## 2025-01-09 DIAGNOSIS — R19.5 MUCUS IN STOOL: ICD-10-CM

## 2025-01-09 DIAGNOSIS — K76.9 LIVER LESION: ICD-10-CM

## 2025-01-09 DIAGNOSIS — M81.0 OSTEOPOROSIS WITHOUT CURRENT PATHOLOGICAL FRACTURE, UNSPECIFIED OSTEOPOROSIS TYPE: ICD-10-CM

## 2025-01-09 DIAGNOSIS — E56.9 VITAMIN D DEFICIENCY: ICD-10-CM

## 2025-01-09 DIAGNOSIS — K58.0 IRRITABLE BOWEL SYNDROME WITH DIARRHEA: ICD-10-CM

## 2025-01-09 DIAGNOSIS — R19.8 ALTERNATING CONSTIPATION AND DIARRHEA: ICD-10-CM

## 2025-01-09 DIAGNOSIS — D64.9 ANEMIA, UNSPECIFIED TYPE: ICD-10-CM

## 2025-01-09 DIAGNOSIS — K59.01 CONSTIPATION: ICD-10-CM

## 2025-01-09 DIAGNOSIS — L29.0 ANAL ITCHING: ICD-10-CM

## 2025-01-09 DIAGNOSIS — Z12.11 COLON CANCER SCREENING: ICD-10-CM

## 2025-01-09 DIAGNOSIS — C43.9 MALIGNANT MELANOMA, UNSPECIFIED SITE: ICD-10-CM

## 2025-01-09 DIAGNOSIS — E73.9 LACTOSE INTOLERANCE: ICD-10-CM

## 2025-01-09 DIAGNOSIS — T78.40XS ALLERGY, SEQUELA: ICD-10-CM

## 2025-01-09 PROCEDURE — 99214 OFFICE O/P EST MOD 30 MIN: CPT | Performed by: INTERNAL MEDICINE

## 2025-01-09 RX ORDER — LEVOTHYROXINE SODIUM 0.03 MG/1
1 TABLET IN THE MORNING ON AN EMPTY STOMACH TABLET ORAL ONCE A DAY
Status: ACTIVE | COMMUNITY

## 2025-01-09 RX ORDER — ONDANSETRON HYDROCHLORIDE 8 MG/1
1 TABLET TABLET, FILM COATED ORAL
Qty: 40 | Refills: 1 | Status: ACTIVE | COMMUNITY
Start: 2023-02-03

## 2025-01-09 NOTE — HPI-TODAY'S VISIT:
The patient was referred to us by Dr. Loc Mcgill for acid reflux. A copy of this note will be sent to the referring physician.  She has had a diagnosis of IBS for years. She tends to see mucus often in the stools. She has been tried on various PPIs. She is hesitant to go back on these. She tried Maalox and had an abnormal reaction.  She had her breast implants removed.She had compliations of wound dehiscence and needed antibiotics for 1 month (Amoxicillin and Clindamycin). She had a lot of nausea and diarrhea while on these. She took her last antibiotic dose on 12/12/24.  She has been using Culturelle to counteract the effects of the antibiotics, but she feels that these are not suiting her well. She has dyspepsia soon after she takes these. She is wondering whether she can open up the Culturelle capsules and just dissolve the contents in apple juice.  She is having anal pain when defecating.   She has intermittent constipation, not much of an issue lately. She has had some bloating on and off.  Even though she states she is unable to eat much and that her GI issues are exacerbated by eating, she has been able to continue gaining weight. Her BMI is now up to 23.   She is also severely deficient in Vit D (Vit D has been as low as 6).  Her Vit D remains very low. She has been tolerating Vit D IM replacement.   She was noted to be borderline anemic in June 2023. Her labs were rechecked in Jan 2024 and Hb was normal. She cannot tolerate PO Fe (she felt it made her too nauseous). She was to make an appt with a Hematologist for Fe infusion.   She has an intolerance to lactose and to multiple other foods.  She has had Amebiasis on 2 occasions.   She has been able to maintain her weight. Not much in the way of abdominal pain or cramping.   She was seen by Dr. Cinda Campbell on 8/26/22 who found a 0.4mm malignant melanoma from the midline upper back ([id number]) s/p  wide excision.   The patient does not take blood thinners.  She has had issues with both hypothyroidism and hyperthyroidism.  She had a BDS which showed osteoporosis. Her insurance won't cover Reclast.   There is no FH of colon cancer or colon polyps in any first degree relatives. An uncle had polyps removed in his 70's.   Her MIL passed away suddenly about 2 months ago which has affected her emotionally. She is originally from [facility].   She has been under increased stress. She has an autistic son and her mother has lymphoma  She is accompanied by her  today.   **She tolerated Clenpiq relatively well however she did get very nauseous and vomited. She required antiemetics when she presented to [facility].  Summary of prior workup: - Labs on 12/22/24: Normal CMP. Estradiol, LH, FSH and Progesterone normal. T Chol 319, TG 71. TPO Abs <34, Thyroglobulin Ab normal. TSH 3.14. Vit D 13. B12 778. Testosterone <12.  - BDS 2024: Osteoporosis as per patient.  - All thyroid studies normal on 5/22/24 - Labs on 1/5/24: T Chol 252, TG 94, , Vit D 6-7, Gluc 88, BUN 14, Cr 0.58, Na 137, Ca 9.5, TB 0.3, AP 56, AST 18, ALT 17, Fe 79, Ferr 102, EBC 6.2, Hb 11.9, MCV 84, Plts 218.Folate , Free T 4 1.2, TSH 3.89. B12 575.   -Labs on 9/1/26/24: WBC 3.7, hemoglobin 12.3, MCV 81, platelets 168, sodium 133, potassium 2.9, glucose 145, BUN 12, creatinine 0.8, calcium 9.1, total protein 8.0, albumin 4.1, total bilirubin 0.4, alkaline phosphatase 52, AST 24, ALT 23.  Lipase 23.  Magnesium 2.0, UDS negative except for opiates screen, UA negative except for 2+ blood and trace bacteria. BDS on 9/26/23: Osteoporosis of the lumbar spine and osteopenia of the R femoral neck, Normal bone mineral density of the L femoral neck.  - Labs on 9.6.23: Normal CMP except for Cr of 1.12 but normal GFR of 61. T Chol 230, HDL 98. - Labs on 7/5/20 3: 8 4.02, free T41.32, total cholesterol 240, triglycerides 72.  HDL 98, .  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  The spleen, adrenal glands and pancreas are normal.  Minimal concentrated bile/sludge in the gallbladder.  No gallstones.  No biliary ductal dilation.  Small renal cyst.  No hydronephrosis.  No dilated loops of bowel.  No ascites.  No concerning lymph nodes.  Portal vein, SMV and splenic vein normal.  No aggressive osseous lesions. - Mg level normal at 2.6 on 4/6/23. - Labs on 6/8/2023: WBC 4.6, hemoglobin 11.4, MCV 84, platelets 218. - Labs on labs: 3/23: Albumin 4.2, total protein 7.9, CRP 0.7, WBC 5.5, eosinophils slightly elevated at 0.11/2.4% range.  Platelets 267, MCV 84, ESR 25, ZANDER 1 in 80 which is high.  No diagnostic immunophenotypic abnormalities detected to suggest leukemia or lymphoma.  SPEP did not show any monoclonal protein detected. - Colonoscopy by me on 1/23 revealed a normal terminal ileum, 4 mm hyperplastic rectal polyp and internal hemorrhoids.  The quality of the prep was good.  A repeat colonoscopy was advised in 10 years.  - Vit D remains quite low at 11.5. Normal CBC with a white blood cell count of 4.2, Hb 11.5, MCV 86 and plats 231. - EGD by me on 7/27/2022: Ectopic gastric mucosa in the upper third of the esophagus, otherwise unremarkable.  No gross lesions in the cardia or fundus.  Mild patchy erythema in the body and antrum.  Normal duodenum/ampulla.  Biopsies were negative for celiac sprue or infectious organisms.  No H. pylori.  Lower esophageal biopsies suggestive of acid reflux changes. - Labs: 6/30/2021: Allergy profile showed was abnormal for peanuts wheat, soybean, sesame seeds. Trypsin 655. Thyroid peroxidase antibodies negative, thyroglobulin antibodies negative.  low ZANDER positive. Rheumatoid factor negative. TSH 4.06. Free T4 1.3. Free T3 0.21. Vitamin D extremely low at 10. B12 511. WBC 5.7, hemoglobin 11.0, MCV 87, platelets 198 glucose 104, BUN 14, creatinine 0.6, sodium 137, potassium 3.7, total protein 7.3, albumin 4.3, total bilirubin 0.3, alkaline phosphatase 49, AST 12, ALT 7 lipase 53. Amylase 70. H. pylori breath test not effective. Iron 74, percent saturation 22, ferritin 78, folate 7.8, - Labs on 6/26/2021: WBC 4.2, hemoglobin 12.6, MCV 89, platelets 221. CMP normal. Antidouble-stranded antibodies negative. Anti-Contreras, antiscleroderma, stroke (and anti-Floresita antibodies negative. - Colonoscopy by  on 5/31/2012: Congested mucosa in the rectum. Nonbleeding external hemorrhoids. Biopsies were negative for active, chronic or microscopic colitis. - EGD by  on 10/21/2011. Normal upper GI tract except for mild antral erythema. No H pylori. Basaloid hyperplasia noted on the lower esoph biopsies. Duodenal biopsies not obtained.

## 2025-06-11 ENCOUNTER — OFFICE VISIT (OUTPATIENT)
Dept: URBAN - METROPOLITAN AREA CLINIC 78 | Facility: CLINIC | Age: 50
End: 2025-06-11

## 2025-06-30 ENCOUNTER — OFFICE VISIT (OUTPATIENT)
Dept: URBAN - METROPOLITAN AREA CLINIC 78 | Facility: CLINIC | Age: 50
End: 2025-06-30

## 2025-06-30 RX ORDER — LEVOTHYROXINE SODIUM 0.03 MG/1
1 TABLET IN THE MORNING ON AN EMPTY STOMACH TABLET ORAL ONCE A DAY
Status: ACTIVE | COMMUNITY

## 2025-06-30 RX ORDER — ONDANSETRON HYDROCHLORIDE 8 MG/1
1 TABLET TABLET, FILM COATED ORAL
Qty: 40 | Refills: 1 | Status: ACTIVE | COMMUNITY
Start: 2023-02-03

## 2025-06-30 NOTE — HPI-TODAY'S VISIT:
The patient was referred to us by Dr. Loc Mcgill for acid reflux. A copy of this note will be sent to the referring physician.  She has had a diagnosis of IBS for years. She tends to see mucus often in the stools. She has been tried on various PPIs. She is hesitant to go back on these. She tried Maalox and had an abnormal reaction.  She had her breast implants removed.She had compliations of wound dehiscence and needed antibiotics for 1 month (Amoxicillin and Clindamycin). She had a lot of nausea and diarrhea while on these. She took her last antibiotic dose on 12/12/24.  She has been using Culturelle to counteract the effects of the antibiotics, but she feels that these are not suiting her well. She has dyspepsia soon after she takes these. She is wondering whether she can open up the Culturelle capsules and just dissolve the contents in apple juice.  She is having anal pain when defecating.   She has intermittent constipation, not much of an issue lately. She has had some bloating on and off.  Even though she states she is unable to eat much and that her GI issues are exacerbated by eating, she has been able to continue gaining weight. Her BMI is now up to 23.   She is also severely deficient in Vit D (Vit D has been as low as 6).  Her Vit D remains very low. She has been tolerating Vit D IM replacement.   She was noted to be borderline anemic in June 2023. Her labs were rechecked in Jan 2024 and Hb was normal. She cannot tolerate PO Fe (she felt it made her too nauseous). She was to make an appt with a Hematologist for Fe infusion.   She has an intolerance to lactose and to multiple other foods.  She has had Amebiasis on 2 occasions.   She has been able to maintain her weight. Not much in the way of abdominal pain or cramping.   She was seen by Dr. Cinda Campbell on 8/26/22 who found a 0.4mm malignant melanoma from the midline upper back ([id number]) s/p  wide excision.   The patient does not take blood thinners.  She has had issues with both hypothyroidism and hyperthyroidism.  She had a BDS which showed osteoporosis. Her insurance won't cover Reclast.   There is no FH of colon cancer or colon polyps in any first degree relatives. An uncle had polyps removed in his 70's.   Her MIL passed away suddenly about 2 months ago which has affected her emotionally. She is originally from [facility].   She has been under increased stress. She has an autistic son and her mother has lymphoma  She is accompanied by her  today.   **She tolerated Clenpiq relatively well however she did get very nauseous and vomited. She required antiemetics when she presented to [facility].  Summary of prior workup: - Labs on 12/22/24: Normal CMP. Estradiol, LH, FSH and Progesterone normal. T Chol 319, TG 71. TPO Abs <34, Thyroglobulin Ab normal. TSH 3.14. Vit D 13. B12 778. Testosterone <12.  - BDS 2024: Osteoporosis as per patient.  - All thyroid studies normal on 5/22/24 - Labs on 1/5/24: T Chol 252, TG 94, , Vit D 6-7, Gluc 88, BUN 14, Cr 0.58, Na 137, Ca 9.5, TB 0.3, AP 56, AST 18, ALT 17, Fe 79, Ferr 102, EBC 6.2, Hb 11.9, MCV 84, Plts 218.Folate , Free T 4 1.2, TSH 3.89. B12 575.   -Labs on 9/1/26/24: WBC 3.7, hemoglobin 12.3, MCV 81, platelets 168, sodium 133, potassium 2.9, glucose 145, BUN 12, creatinine 0.8, calcium 9.1, total protein 8.0, albumin 4.1, total bilirubin 0.4, alkaline phosphatase 52, AST 24, ALT 23.  Lipase 23.  Magnesium 2.0, UDS negative except for opiates screen, UA negative except for 2+ blood and trace bacteria. BDS on 9/26/23: Osteoporosis of the lumbar spine and osteopenia of the R femoral neck, Normal bone mineral density of the L femoral neck.  - Labs on 9.6.23: Normal CMP except for Cr of 1.12 but normal GFR of 61. T Chol 230, HDL 98. - Labs on 7/5/20 3: 8 4.02, free T41.32, total cholesterol 240, triglycerides 72.  HDL 98, .  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  The spleen, adrenal glands and pancreas are normal.  Minimal concentrated bile/sludge in the gallbladder.  No gallstones.  No biliary ductal dilation.  Small renal cyst.  No hydronephrosis.  No dilated loops of bowel.  No ascites.  No concerning lymph nodes.  Portal vein, SMV and splenic vein normal.  No aggressive osseous lesions. - Mg level normal at 2.6 on 4/6/23. - Labs on 6/8/2023: WBC 4.6, hemoglobin 11.4, MCV 84, platelets 218. - Labs on labs: 3/23: Albumin 4.2, total protein 7.9, CRP 0.7, WBC 5.5, eosinophils slightly elevated at 0.11/2.4% range.  Platelets 267, MCV 84, ESR 25, ZANDER 1 in 80 which is high.  No diagnostic immunophenotypic abnormalities detected to suggest leukemia or lymphoma.  SPEP did not show any monoclonal protein detected. - Colonoscopy by me on 1/23 revealed a normal terminal ileum, 4 mm hyperplastic rectal polyp and internal hemorrhoids.  The quality of the prep was good.  A repeat colonoscopy was advised in 10 years.  - Vit D remains quite low at 11.5. Normal CBC with a white blood cell count of 4.2, Hb 11.5, MCV 86 and plats 231. - EGD by me on 7/27/2022: Ectopic gastric mucosa in the upper third of the esophagus, otherwise unremarkable.  No gross lesions in the cardia or fundus.  Mild patchy erythema in the body and antrum.  Normal duodenum/ampulla.  Biopsies were negative for celiac sprue or infectious organisms.  No H. pylori.  Lower esophageal biopsies suggestive of acid reflux changes. - Labs: 6/30/2021: Allergy profile showed was abnormal for peanuts wheat, soybean, sesame seeds. Trypsin 655. Thyroid peroxidase antibodies negative, thyroglobulin antibodies negative.  low ZANDER positive. Rheumatoid factor negative. TSH 4.06. Free T4 1.3. Free T3 0.21. Vitamin D extremely low at 10. B12 511. WBC 5.7, hemoglobin 11.0, MCV 87, platelets 198 glucose 104, BUN 14, creatinine 0.6, sodium 137, potassium 3.7, total protein 7.3, albumin 4.3, total bilirubin 0.3, alkaline phosphatase 49, AST 12, ALT 7 lipase 53. Amylase 70. H. pylori breath test not effective. Iron 74, percent saturation 22, ferritin 78, folate 7.8, - Labs on 6/26/2021: WBC 4.2, hemoglobin 12.6, MCV 89, platelets 221. CMP normal. Antidouble-stranded antibodies negative. Anti-Contreras, antiscleroderma, stroke (and anti-Floresita antibodies negative. - Colonoscopy by  on 5/31/2012: Congested mucosa in the rectum. Nonbleeding external hemorrhoids. Biopsies were negative for active, chronic or microscopic colitis. - EGD by  on 10/21/2011. Normal upper GI tract except for mild antral erythema. No H pylori. Basaloid hyperplasia noted on the lower esoph biopsies. Duodenal biopsies not obtained.

## 2025-07-07 ENCOUNTER — OFFICE VISIT (OUTPATIENT)
Dept: URBAN - METROPOLITAN AREA CLINIC 78 | Facility: CLINIC | Age: 50
End: 2025-07-07